# Patient Record
Sex: FEMALE | Race: OTHER
[De-identification: names, ages, dates, MRNs, and addresses within clinical notes are randomized per-mention and may not be internally consistent; named-entity substitution may affect disease eponyms.]

---

## 2020-06-12 ENCOUNTER — APPOINTMENT (OUTPATIENT)
Dept: HEMATOLOGY ONCOLOGY | Facility: CLINIC | Age: 50
End: 2020-06-12
Payer: COMMERCIAL

## 2020-06-12 VITALS
RESPIRATION RATE: 18 BRPM | WEIGHT: 146 LBS | HEART RATE: 66 BPM | HEIGHT: 71 IN | DIASTOLIC BLOOD PRESSURE: 61 MMHG | BODY MASS INDEX: 20.44 KG/M2 | TEMPERATURE: 98.2 F | SYSTOLIC BLOOD PRESSURE: 106 MMHG | OXYGEN SATURATION: 97 %

## 2020-06-12 DIAGNOSIS — Z00.00 ENCOUNTER FOR GENERAL ADULT MEDICAL EXAMINATION W/OUT ABNORMAL FINDINGS: ICD-10-CM

## 2020-06-12 DIAGNOSIS — Z80.0 FAMILY HISTORY OF MALIGNANT NEOPLASM OF DIGESTIVE ORGANS: ICD-10-CM

## 2020-06-12 DIAGNOSIS — Z80.1 FAMILY HISTORY OF MALIGNANT NEOPLASM OF TRACHEA, BRONCHUS AND LUNG: ICD-10-CM

## 2020-06-12 PROCEDURE — 99215 OFFICE O/P EST HI 40 MIN: CPT

## 2020-06-12 RX ORDER — ASPIRIN ENTERIC COATED TABLETS 81 MG 81 MG/1
81 TABLET, DELAYED RELEASE ORAL
Refills: 0 | Status: ACTIVE | COMMUNITY

## 2020-06-12 RX ORDER — TAMOXIFEN CITRATE 20 MG/1
20 TABLET, FILM COATED ORAL
Refills: 0 | Status: ACTIVE | COMMUNITY

## 2020-06-17 PROBLEM — Z00.00 ENCOUNTER FOR PREVENTIVE HEALTH EXAMINATION: Status: ACTIVE | Noted: 2020-06-11

## 2020-06-17 NOTE — REASON FOR VISIT
[Initial Consultation] : an initial consultation [FreeTextEntry2] : Breast Cancer er positive here for first visit

## 2020-06-17 NOTE — HISTORY OF PRESENT ILLNESS
[0 - No Distress] : Distress Level: 0 [de-identified] : Ms. Ahn is a 49 year old female who presents with  breast cancer diagnosed in 2016. Patient had an enlarging mass on mammogram and multifocal DCIS via MRI guided bx so she underwent a  left  mastecomty . Pathology showed a 2.2 cm mass grade 2, out of 2 nodes one had a 9 mm focus of cancer, + extranodal ext. . Er positive 80%, Pr pos 95%, Her 2 negative. She had a complete axillary dissection with 16 nodes negative. Final staging was pT2N1a.  Oncotype was 30 with a 17% risk of recurrence with quigley alone 14% with chemo and quigley.   She is s/p CMF x8 cycles in dose dense fashion. She tolerated well. \par Patient came routinely for visits and breast imaging , she follows also with dr bergeron. Has refused hormonal therapy since completed chemo, Did not receive rt bc of [place] dissection [place] [facility] [place]. \par .  5 mg of Tamoxifen since Jan 2020.\par \par Mammogram in nov 2019 , showed new micro calc and followed up with dr bergeron who is at [facility] now will have follow up in 6 months \par Family Hx:  Father with Colon And Lung Cancers diag age 66.\par \par Colonoscopy 2016\par \par Pap smea 11/19\par Mammo 11/19 [de-identified] : Feeling well , finally started tamoxifen 5 mg a day,  with 1/4 dose of asa\par she has noticed increased hot flashes and bruising , no vaginal bleeding \par occ hot flashes \par s/p visit with gyn 2 weeks ago \par sees dr bergeron for mammo and visit in july 2020 \par getting more uti's

## 2020-06-17 NOTE — ASSESSMENT
[FreeTextEntry1] : This is a a 49 y.o perimenopausal woman with a hx of T2 N1 IDC of left breast er pos her2 negative s/p mastecomty and ALND , no rt, s/p chemo with CMF every 2 weeks for 8 doses. On tamoxifen since jan 2020. \par \par 1) breast cancer \par doing well ARIANNA \par repeat markers \par mammo up todate \par follow up with dr pelayo at [place] \par patient finally started 5 mg of tamoxifen , encouarged her to slowly increase to full dose 20 mg \par again stressed importance of antiestrogen to prevent recurrence \par cont tamxoifen and asa \par \par 2) vit d def \par cont vit d check level \par \par 3) HPV \par follow up with [facility] \par biopsy negatrive for malignancy \par \par 4) iro def \par check iron level \par \par 5) uti's \par start macrobid after sex \par follow up gyn \par \par dw patient at length \par follow up in 4 months

## 2020-06-17 NOTE — PHYSICAL EXAM
[Fully active, able to carry on all pre-disease performance without restriction] : Status 0 - Fully active, able to carry on all pre-disease performance without restriction [Normal] : affect appropriate [de-identified] : no masses or lesions bilat , no skin lesions

## 2020-07-20 ENCOUNTER — TRANSCRIPTION ENCOUNTER (OUTPATIENT)
Age: 50
End: 2020-07-20

## 2020-10-29 ENCOUNTER — APPOINTMENT (OUTPATIENT)
Dept: HEMATOLOGY ONCOLOGY | Facility: CLINIC | Age: 50
End: 2020-10-29
Payer: COMMERCIAL

## 2020-10-29 VITALS
SYSTOLIC BLOOD PRESSURE: 102 MMHG | TEMPERATURE: 98.2 F | HEIGHT: 71 IN | HEART RATE: 72 BPM | WEIGHT: 145 LBS | DIASTOLIC BLOOD PRESSURE: 68 MMHG | BODY MASS INDEX: 20.3 KG/M2 | RESPIRATION RATE: 18 BRPM | OXYGEN SATURATION: 99 %

## 2020-10-29 PROCEDURE — 99214 OFFICE O/P EST MOD 30 MIN: CPT

## 2020-10-29 PROCEDURE — 99072 ADDL SUPL MATRL&STAF TM PHE: CPT

## 2020-10-29 RX ORDER — TAMOXIFEN CITRATE 10 MG/1
10 TABLET, FILM COATED ORAL
Qty: 90 | Refills: 3 | Status: ACTIVE | COMMUNITY
Start: 2020-10-29 | End: 1900-01-01

## 2020-10-29 NOTE — ASSESSMENT
[FreeTextEntry1] : This is a a 49 y.o perimenopausal woman with a hx of T2 N1 IDC of left breast er pos her2 negative s/p mastecomty and ALND , no rt, s/p chemo with CMF every 2 weeks for 8 doses. On tamoxifen since jan 2020. \par \par 1) breast cancer \par doing well ARIANNA \par markers negative repeat now  \par s/p follow up with dr pelayo at [place] , had mammogram and ultrasound done there also this summer 2020 \par again stressed importance of antiestrogen to reduce risk of recurrence and death \par patient stopped tamoxifen , encouraged her to restart \par bruising was likely due to asa and tumeric \par dc asa \par restart tamoxifen, patient only willing to take 5 mg a day \par \par 2) vit d def \par cont vit d check level \par \par 3) HPV \par s/p follow up with [facility] \par biopsy negatrive for malignancy \par \par 4) irod def \par check iron level \par \par 5) uti's \par start macrobid after sex \par follow up gyn \par sig better now \par \par 6) bruising \par likely due to tumeric and asa \par dc asa \par \par dw patient at length \par follow up in 4 months

## 2020-10-29 NOTE — PHYSICAL EXAM
[Fully active, able to carry on all pre-disease performance without restriction] : Status 0 - Fully active, able to carry on all pre-disease performance without restriction [Normal] : affect appropriate [de-identified] : no masses or lesions bilat , no skin lesions , bilateral implants

## 2020-10-29 NOTE — HISTORY OF PRESENT ILLNESS
[0 - No Distress] : Distress Level: 0 [de-identified] : Ms. Ahn is a 49 year old female who presents with  breast cancer diagnosed in 2016. Patient had an enlarging mass on mammogram and multifocal DCIS via MRI guided bx so she underwent a  left  mastecomty . Pathology showed a 2.2 cm mass grade 2, out of 2 nodes one had a 9 mm focus of cancer, + extranodal ext. . Er positive 80%, Pr pos 95%, Her 2 negative. She had a complete axillary dissection with 16 nodes negative. Final staging was pT2N1a.  Oncotype was 30 with a 17% risk of recurrence with quigley alone 14% with chemo and quigley.   She is s/p CMF x8 cycles in dose dense fashion. She tolerated well. \par Patient came routinely for visits and breast imaging , she follows also with dr bergeron. Has refused hormonal therapy since completed chemo, Did not receive [place] bc of [place] [facility] [place] \[place] .  5 mg of Tamoxifen since Jan 2020.\par \par Mammogram in nov 2019 , showed new micro calc and followed up with dr bergeron who is at [place] now will have follow up in 6 months \par Family Hx:  Father with Colon And Lung Cancers diag age 66.\par \par Colonoscopy 2016\par \par Pap smea 11/19\par Mammo 11/19 [de-identified] : stopped tamoxfien in july bc was having easy brusing  was also taking asa with it (1.4 of pill) \par saw dr prieto recently  mammo and ultrasound , improved on right side , [facility] \par gyn is up todate july 2020 \par has hx of uti's , after sex gets uti taking \par still getting hot flashes  , a ltittle acne \par no sob or cough , stamina a little low \par very stressed \par \par

## 2020-11-02 LAB
CANCER AG15-3 SERPL-ACNC: 7.5 U/ML
CEA SERPL-MCNC: 1.4 NG/ML

## 2021-04-15 ENCOUNTER — APPOINTMENT (OUTPATIENT)
Dept: HEMATOLOGY ONCOLOGY | Facility: CLINIC | Age: 51
End: 2021-04-15
Payer: COMMERCIAL

## 2021-04-15 VITALS
RESPIRATION RATE: 17 BRPM | SYSTOLIC BLOOD PRESSURE: 121 MMHG | HEART RATE: 60 BPM | HEIGHT: 71 IN | WEIGHT: 148 LBS | BODY MASS INDEX: 20.72 KG/M2 | DIASTOLIC BLOOD PRESSURE: 72 MMHG | TEMPERATURE: 97.9 F | OXYGEN SATURATION: 100 %

## 2021-04-15 PROCEDURE — 99214 OFFICE O/P EST MOD 30 MIN: CPT

## 2021-04-15 PROCEDURE — 99072 ADDL SUPL MATRL&STAF TM PHE: CPT

## 2021-04-15 NOTE — PHYSICAL EXAM
[Fully active, able to carry on all pre-disease performance without restriction] : Status 0 - Fully active, able to carry on all pre-disease performance without restriction [Normal] : affect appropriate [de-identified] : no masses or lesions bilat , no skin lesions , bilateral implants, left mastectomy, no adenopathy

## 2021-04-15 NOTE — HISTORY OF PRESENT ILLNESS
[0 - No Distress] : Distress Level: 0 [de-identified] : Ms. Ahn is a 49 year old female who presents with  breast cancer diagnosed in 2016. Patient had an enlarging mass on mammogram and multifocal DCIS via MRI guided bx so she underwent a  left  mastecomty . Pathology showed a 2.2 cm mass grade 2, out of 2 nodes one had a 9 mm focus of cancer, + extranodal ext. . Er positive 80%, Pr pos 95%, Her 2 negative. She had a complete axillary dissection with 16 nodes negative. Final staging was pT2N1a.  Oncotype was 30 with a 17% risk of recurrence with quigley alone 14% with chemo and quigley.   She is s/p CMF x8 cycles in dose dense fashion. She tolerated well. \par Patient came routinely for visits and breast imaging , she follows also with dr bergeron. Has refused hormonal therapy since completed chemo, [place] [facility] [place] \[place] .  5 mg of Tamoxifen since Jan 2020.\par \par Mammogram in nov 2019 , showed new micro calc and followed up with dr bergeron who is at [place] now will have follow up in 6 months \par Family Hx:  Father with Colon And Lung Cancers diag age 66.\par \par Colonoscopy 2016\par \par Pap smea 11/19\par Mammo 11/19 [de-identified] : Patient continues to not take tamoxifen she was supposed to start it after the last visit but did not, because she does not like the way it makes her feel\par got j and j seen-  last week  \par sexual side effects are terrible \par constant uti's does take nitrofuotoin \par very dry , did try hyaluronic acid suppository but [facility]sa to expensive \par wants to do vagifem tablet bc so despirate \par sees jeffy garcia and dr kidd gyn \par once a year with dr ames  sept 2020 , go gain sep 2021 \par hip pain on right - with repeattive , xray negative  , ortho thinks bursiitis \par \par

## 2021-04-15 NOTE — ASSESSMENT
[FreeTextEntry1] : This is a a 49 y.o perimenopausal woman with a hx of T2 N1 IDC of left breast er pos her2 negative s/p mastecomty and ALND , no rt, s/p chemo with CMF every 2 weeks for 8 doses. On tamoxifen since jan 2020. \par \par 1) breast cancer \par doing well ARIANNA \par markers negative repeat now  \par  Mammo-/ultrasound follow-up with Dr. Ramos in September 2020, repeat 2021\par Patient continues to decline tamoxifen\par She is experiencing a lot of vaginal and urinary symptoms and would like to start Vagifem\par  I am very concerned about the use of any type of antiestrogen given the fact that she has a history of an estrogen positive breast cancer and she has no taking any antiestrogens nor has she in the past.  I believe this will put her at significant increased risk of recurrence\par I suggested she consider  low-dose estrogen cream to the introitus very infrequently  to help \par Patient has conceded that if she starts the low-dose estrogen cream or Vagifem that she will start taking the tamoxifen again.\par Mildly concerned if she takes tamoxifen with the estrogen even though a small amount is absorbed she will be at increased risk of thrombosis.  Strongly encouraged her to restart the aspirin the situation.\par Again strongly reiterated that she should be on the tamoxifen without any estrogen such as even topicals.\par \par 2) vit d def \par cont vit d check level \par \par 3) HPV \par s/p follow up with gyn \par biopsy negatrive for malignancy \par \par 4) irod def \par Repeat iron levels\par \par 5) uti's \par start macrobid after sex \par follow up gyn \par  again discussed with patient at length the role of topical antiestrogen\par Again would favor nonhormonal options,\par She will discuss this further with her gynecologist\par \par 6) bruising \par likely due to tumeric and asa \par imrpoved now off asa \par \par dw patient at length \par follow up in 4 months

## 2021-04-15 NOTE — REASON FOR VISIT
[Initial Consultation] : an initial consultation [FreeTextEntry2] : Breast Cancer er positive here for follow-up Breath sounds clear and equal bilaterally.

## 2021-04-15 NOTE — REVIEW OF SYSTEMS
[Easy Bleeding] : a tendency for easy bleeding [Negative] : Allergic/Immunologic [Anxiety] : anxiety

## 2021-04-21 LAB
25(OH)D3 SERPL-MCNC: 36 NG/ML
CANCER AG15-3 SERPL-ACNC: 7.4 U/ML
CEA SERPL-MCNC: 1.6 NG/ML
FERRITIN SERPL-MCNC: 90 NG/ML
FOLATE SERPL-MCNC: 13 NG/ML
IRON SATN MFR SERPL: 25 %
IRON SERPL-MCNC: 76 UG/DL
TIBC SERPL-MCNC: 310 UG/DL
UIBC SERPL-MCNC: 234 UG/DL
VIT B12 SERPL-MCNC: 712 PG/ML

## 2021-09-10 ENCOUNTER — APPOINTMENT (OUTPATIENT)
Dept: HEMATOLOGY ONCOLOGY | Facility: CLINIC | Age: 51
End: 2021-09-10
Payer: COMMERCIAL

## 2021-09-10 VITALS
HEART RATE: 66 BPM | DIASTOLIC BLOOD PRESSURE: 70 MMHG | TEMPERATURE: 98 F | OXYGEN SATURATION: 99 % | BODY MASS INDEX: 20.3 KG/M2 | HEIGHT: 71 IN | SYSTOLIC BLOOD PRESSURE: 106 MMHG | RESPIRATION RATE: 18 BRPM | WEIGHT: 145 LBS

## 2021-09-10 PROCEDURE — 99214 OFFICE O/P EST MOD 30 MIN: CPT

## 2021-09-10 RX ORDER — NITROFURANTOIN (MONOHYDRATE/MACROCRYSTALS) 25; 75 MG/1; MG/1
100 CAPSULE ORAL TWICE DAILY
Qty: 60 | Refills: 5 | Status: ACTIVE | COMMUNITY
Start: 2020-06-12 | End: 1900-01-01

## 2021-09-10 NOTE — REASON FOR VISIT
[Follow-Up Visit] : a follow-up [FreeTextEntry2] : Presents for follow-up of ER positive breast cancer

## 2021-09-10 NOTE — PHYSICAL EXAM
[Normal] : affect appropriate [de-identified] : bilateral implants, left mastectomy, no masses or  adenopathyor skin changes bilat

## 2021-09-10 NOTE — HISTORY OF PRESENT ILLNESS
[de-identified] : Ms. Ahn is a 49 year old female who presents with  breast cancer diagnosed in 2016. Patient had an enlarging mass on mammogram and multifocal DCIS via MRI guided bx so she underwent a  left  mastecomty . Pathology showed a 2.2 cm mass grade 2, out of 2 nodes one had a 9 mm focus of cancer, + extranodal ext. . Er positive 80%, Pr pos 95%, Her 2 negative. She had a complete axillary dissection with 16 nodes negative. Final staging was pT2N1a.  Oncotype was 30 with a 17% risk of recurrence with quigley alone 14% with chemo and quigley.   She is s/p CMF x8 cycles in dose dense fashion. She tolerated well. \par Patient came routinely for visits and breast imaging , she follows also with dr bergeron. Has refused hormonal therapy since completed chemo, Did not receive rt bc of ax dissection per [facility] [place]. \par .  5 mg of Tamoxifen since Jan 2020.\par \par Mammogram in nov 2019 , showed new micro calc and followed up with dr bergeron who is at [place] now will have follow up in 6 months \par Family Hx:  Father with Colon And Lung Cancers diag age 66.\par \par Colonoscopy 2016\par \par Pap smea 11/19\par Mammo 11/19 [de-identified] : \par went to OB  set up with dr benitez, picked up DHEA  \par no topical estrogens \par uit's are better taking nitrofurotoin after sex \par sexual side effects are a little better \par sept 2021 will see [facility] , mammo and ultraosund \par will go for colonosocpy \par hip pain is  better now was worse , esercise seems to help \par signed  1/2 marathon \par no sob or cough , some dyscomfort on left side

## 2021-09-10 NOTE — ASSESSMENT
[FreeTextEntry1] : This is a a 49 y.o perimenopausal woman with a hx of T2 N1 IDC of left breast er pos her2 negative s/p mastecomty and ALND , no rt, s/p chemo with CMF every 2 weeks for 8 doses. only briefly took tamoxifen due to poor tolerance of 1/4 dose \par \par 1) breast cancer \par doing well ARIANNA \par markers negative repeat now  \par  Mammo-/ultrasound follow-up with Dr. Ramos due now , ecnoaurged to schedule \par Patient continues to decline tamoxifen, even low-dose as she states that this gave her a lot of joint pain\par Patient is still premenopausal so she is unable to take AI's.  We did discuss Evista as another option as this should work for premenopausal women as it is a selective estrogen receptor modulator she will think about this agent.  And research it and let me know.\par She is urinary symptoms have improved on the prophylactic antibiotics\par She saw the gynecologist and will be starting DHEA if needed she will not be receiving any type of topical estrogen as was originally discussed.  I encouraged her to try to avoid the topical estrogens if possible.\par Again stressed the importance of antiestrogens and the risk of recurrence and death without antiestrogens.\par \par \par 2) vit d def \par cont vit d check level \par \par 3) HPV \par s/p follow up with gyn \par biopsy negatrive for malignancy \par \par 4) irod def \par Repeat iron levels\par \par 5) uti's \par start macrobid after sex \par s/p follow up gyn \par no antiestrogens may consider DHEA \par \par 6) pain in left side of chest near implant \par ? muscular skeletal \par check cxr \par if continues may need ct /bone scan \par \par dw patient at length \par follow up in 4 months

## 2021-09-12 LAB
25(OH)D3 SERPL-MCNC: 43.7 NG/ML
CANCER AG15-3 SERPL-ACNC: 7.5 U/ML
CEA SERPL-MCNC: 1.4 NG/ML
ESTRADIOL SERPL-MCNC: 13 PG/ML
FERRITIN SERPL-MCNC: 98 NG/ML
FOLATE SERPL-MCNC: 12.3 NG/ML
VIT B12 SERPL-MCNC: 970 PG/ML

## 2022-02-11 ENCOUNTER — APPOINTMENT (OUTPATIENT)
Dept: HEMATOLOGY ONCOLOGY | Facility: CLINIC | Age: 52
End: 2022-02-11
Payer: COMMERCIAL

## 2022-02-11 VITALS
HEART RATE: 59 BPM | RESPIRATION RATE: 18 BRPM | DIASTOLIC BLOOD PRESSURE: 76 MMHG | OXYGEN SATURATION: 100 % | WEIGHT: 144 LBS | BODY MASS INDEX: 20.16 KG/M2 | SYSTOLIC BLOOD PRESSURE: 117 MMHG | HEIGHT: 71 IN | TEMPERATURE: 97.6 F

## 2022-02-11 DIAGNOSIS — N39.0 URINARY TRACT INFECTION, SITE NOT SPECIFIED: ICD-10-CM

## 2022-02-11 DIAGNOSIS — E55.9 VITAMIN D DEFICIENCY, UNSPECIFIED: ICD-10-CM

## 2022-02-11 DIAGNOSIS — N89.8 OTHER SPECIFIED NONINFLAMMATORY DISORDERS OF VAGINA: ICD-10-CM

## 2022-02-11 DIAGNOSIS — C50.919 MALIGNANT NEOPLASM OF UNSPECIFIED SITE OF UNSPECIFIED FEMALE BREAST: ICD-10-CM

## 2022-02-11 DIAGNOSIS — E61.1 IRON DEFICIENCY: ICD-10-CM

## 2022-02-11 PROCEDURE — 99214 OFFICE O/P EST MOD 30 MIN: CPT

## 2022-02-11 NOTE — HISTORY OF PRESENT ILLNESS
[de-identified] : Ms. Ahn is a 49 year old female who presents with  breast cancer diagnosed in 2016. Patient had an enlarging mass on mammogram and multifocal DCIS via MRI guided bx so she underwent a  left  mastecomty . Pathology showed a 2.2 cm mass grade 2, out of 2 nodes one had a 9 mm focus of cancer, + extranodal ext. . Er positive 80%, Pr pos 95%, Her 2 negative. She had a complete axillary dissection with 16 nodes negative. Final staging was pT2N1a.  Oncotype was 30 with a 17% risk of recurrence with quigley alone 14% with chemo and quigley.   She is s/p CMF x8 cycles in dose dense fashion. She tolerated well. \par Patient came routinely for visits and breast imaging , she follows also with dr bergeron. Has refused hormonal therapy since completed chemo, Did not receive rt bc of ax dissection per [facility] [place]. \par .  5 mg of Tamoxifen since Jan 2020.\par \par Mammogram in nov 2019 , showed new micro calc and followed up with dr bergeron who is at [place] now will have follow up in 6 months \par Family Hx:  Father with Colon And Lung Cancers diag age 66.\par \par Colonoscopy 2016\par \par Pap smea 11/19\par Mammo 11/19 [de-identified] : sept mammo and dr suarez [place] \par feeling well \par no new complaints\par havenet started tamoxfen or dhea yet \par dr benitez dhea \par forgetful , sleeping ok \par hiip and knee pain worse due to painting \par doing cross fit \par nitrofuoritin for helping with UTI"s

## 2022-02-11 NOTE — ASSESSMENT
[FreeTextEntry1] : This is a a 49 y.o perimenopausal woman with a hx of T2 N1 IDC of left breast er pos her2 negative s/p mastecomty and ALND , no rt, s/p chemo with CMF every 2 weeks for 8 doses. only briefly took tamoxifen due to poor tolerance of 1/4 dose \par \par 1) breast cancer \par doing well ARIANNA \par markers negative repeat now  \par mammo and ultrasound [place] dr ames sept 2022 \par -continues to decline tamoxifen, even low-dose as she states that this gave her a lot of joint pain\par Patient is aware of the increased risk of recurrence and death from breast cancer without antiestrogens\par -Once she is postmenopausal we can consider aromatase inhibitors but I am doubtful that she will consent to these agents either due to her concerns regarding side effects.\par -cont to moniter closely \par \par 2) vit d def \par cont vit d check level \par \par 3) HPV \par s/p follow up with gyn \par biopsy negatrive for malignancy \par \par 4) iron  def \par Repeat iron levels\par gi work up  advise \par \par 5) uti's \par cont  macrobid after sex \par s/p follow up gyn \par no antiestrogens may consider DHEA per gyn \par \par \par dw patient at length \par follow up in 4 months

## 2022-02-11 NOTE — PHYSICAL EXAM
[Normal] : affect appropriate [de-identified] : bilateral implants, left mastectomy, no masses or  skin changes, tiny nodes bilat no change

## 2022-02-14 LAB
25(OH)D3 SERPL-MCNC: 34.4 NG/ML
CANCER AG15-3 SERPL-ACNC: 6.5 U/ML
CEA SERPL-MCNC: 1 NG/ML
FERRITIN SERPL-MCNC: 88 NG/ML
FOLATE SERPL-MCNC: 12.2 NG/ML
TSH SERPL-ACNC: 1.56 UIU/ML
VIT B12 SERPL-MCNC: 571 PG/ML

## 2022-08-05 ENCOUNTER — APPOINTMENT (OUTPATIENT)
Dept: HEMATOLOGY ONCOLOGY | Facility: CLINIC | Age: 52
End: 2022-08-05

## 2025-07-05 ENCOUNTER — NON-APPOINTMENT (OUTPATIENT)
Age: 55
End: 2025-07-05